# Patient Record
Sex: MALE | Race: BLACK OR AFRICAN AMERICAN | URBAN - METROPOLITAN AREA
[De-identification: names, ages, dates, MRNs, and addresses within clinical notes are randomized per-mention and may not be internally consistent; named-entity substitution may affect disease eponyms.]

---

## 2017-05-25 ENCOUNTER — OFFICE VISIT (OUTPATIENT)
Dept: FAMILY MEDICINE CLINIC | Facility: CLINIC | Age: 27
End: 2017-05-25

## 2017-05-25 VITALS
HEART RATE: 80 BPM | TEMPERATURE: 99 F | HEIGHT: 72.5 IN | SYSTOLIC BLOOD PRESSURE: 124 MMHG | RESPIRATION RATE: 20 BRPM | BODY MASS INDEX: 21.59 KG/M2 | DIASTOLIC BLOOD PRESSURE: 70 MMHG | WEIGHT: 161.19 LBS

## 2017-05-25 DIAGNOSIS — R19.7 DIARRHEA, UNSPECIFIED TYPE: Primary | ICD-10-CM

## 2017-05-25 PROCEDURE — 99203 OFFICE O/P NEW LOW 30 MIN: CPT | Performed by: FAMILY MEDICINE

## 2017-05-25 NOTE — PATIENT INSTRUCTIONS
Advice plenty of fluids, gatorade. May return to work tomorrow if feeling better. Return to clinic if diarrhea returns or any new concern. Advice to return to clinic for physical exam when improved.

## 2017-05-25 NOTE — PROGRESS NOTES
[facility]  PROGRESS NOTE  Chief Complaint:   Patient presents with:  Diarrhea: was seen at [facility] care   Note For Work      HPI:   This is a 33year old male presents to establish care at clinic.   Patient started having diarrhea last discomfort, palpitations, edema, dyspnea on exertion or at rest.  RESPIRATORY:  Denies shortness of breath, wheezing, cough or sputum.   GASTROINTESTINAL:  Denies abdominal pain, nausea, vomiting, constipation, denies any blood in stool, see HPI  Gladis Scales bruising, good turgor. LYMPHATIC: No cervical lymphadenopathy, no other lymphadenopathy. MUSCULOSKELETAL: Normal ROM, no joint pain, or muscle weakness in all extremity. BACK: No tenderness, no spasm, SLR test negative, FROM.    NEUROLOGICAL:  No defici

## 2017-06-08 ENCOUNTER — OFFICE VISIT (OUTPATIENT)
Dept: FAMILY MEDICINE CLINIC | Facility: CLINIC | Age: 27
End: 2017-06-08

## 2017-06-08 ENCOUNTER — HOSPITAL ENCOUNTER (OUTPATIENT)
Dept: GENERAL RADIOLOGY | Age: 27
Discharge: HOME | End: 2017-06-08
Attending: NURSE PRACTITIONER
Payer: COMMERCIAL

## 2017-06-08 ENCOUNTER — TELEPHONE (OUTPATIENT)
Dept: FAMILY MEDICINE CLINIC | Facility: CLINIC | Age: 27
End: 2017-06-08

## 2017-06-08 VITALS
RESPIRATION RATE: 20 BRPM | WEIGHT: 160 LBS | TEMPERATURE: 98 F | DIASTOLIC BLOOD PRESSURE: 80 MMHG | SYSTOLIC BLOOD PRESSURE: 100 MMHG | HEIGHT: 72.5 IN | HEART RATE: 80 BPM | BODY MASS INDEX: 21.43 KG/M2

## 2017-06-08 DIAGNOSIS — M25.512 ACUTE PAIN OF LEFT SHOULDER: Primary | ICD-10-CM

## 2017-06-08 DIAGNOSIS — M25.512 ACUTE PAIN OF LEFT SHOULDER: ICD-10-CM

## 2017-06-08 PROCEDURE — 73030 X-RAY EXAM OF SHOULDER: CPT | Performed by: NURSE PRACTITIONER

## 2017-06-08 PROCEDURE — 99213 OFFICE O/P EST LOW 20 MIN: CPT | Performed by: NURSE PRACTITIONER

## 2017-06-08 RX ORDER — IBUPROFEN 600 MG/1
600 TABLET ORAL EVERY 8 HOURS PRN
Qty: 50 TABLET | Refills: 0 | Status: SHIPPED | OUTPATIENT
Start: 2017-06-08

## 2017-06-08 NOTE — TELEPHONE ENCOUNTER
Patient informed of the below results and recommendations. Patient will contact [facility] to schedule an appt. Referral faxed. Patient will also stop by to  a copy of his xray. Reyes Prince and Mary informed to make a copy.  Karina Booth, 06/08/2017, 3:4

## 2017-06-08 NOTE — PROGRESS NOTES
HPI:    Patient ID: Virginia Castaneda is a 33year old male. HPI   Hurt shoulder on Monday when doing a flip. Landed on arms and felt the pain when pushing and twisting to get up. Review of Systems   Constitutional: Negative.     Musculoskeletal:

## 2017-06-08 NOTE — TELEPHONE ENCOUNTER
----- Message from BUSTER العلي sent at 6/8/2017  3:21 PM CDT -----  Radiologist read study as an [facility] separation. Recommend arm sling and referral to Ortho. Please have patient come to office to get a copy of the x-ray. Lisa Ferrara, 06/08/2017,

## 2017-06-08 NOTE — PATIENT INSTRUCTIONS
Recommend ice and ibuprofen. Will call if radiologist sees something. If not improving, Physical Therapy or ortho referral.   Otherwise follow-up as needed.

## 2017-08-30 ENCOUNTER — OFFICE VISIT (OUTPATIENT)
Dept: FAMILY MEDICINE CLINIC | Facility: CLINIC | Age: 27
End: 2017-08-30

## 2017-08-30 ENCOUNTER — LAB ENCOUNTER (OUTPATIENT)
Dept: LAB | Age: 27
End: 2017-08-30
Attending: NURSE PRACTITIONER
Payer: COMMERCIAL

## 2017-08-30 VITALS
RESPIRATION RATE: 16 BRPM | BODY MASS INDEX: 22.64 KG/M2 | SYSTOLIC BLOOD PRESSURE: 120 MMHG | OXYGEN SATURATION: 97 % | HEART RATE: 81 BPM | TEMPERATURE: 97 F | WEIGHT: 169 LBS | HEIGHT: 72.5 IN | DIASTOLIC BLOOD PRESSURE: 82 MMHG

## 2017-08-30 DIAGNOSIS — R11.10 ABDOMINAL PAIN WITH VOMITING: Primary | ICD-10-CM

## 2017-08-30 DIAGNOSIS — R10.9 ABDOMINAL PAIN WITH VOMITING: Primary | ICD-10-CM

## 2017-08-30 DIAGNOSIS — R11.10 ABDOMINAL PAIN WITH VOMITING: ICD-10-CM

## 2017-08-30 DIAGNOSIS — R11.2 NAUSEA AND VOMITING IN ADULT: ICD-10-CM

## 2017-08-30 DIAGNOSIS — R10.9 ABDOMINAL PAIN WITH VOMITING: ICD-10-CM

## 2017-08-30 DIAGNOSIS — R19.5 CHANGE IN STOOL: ICD-10-CM

## 2017-08-30 DIAGNOSIS — R10.84 GENERALIZED ABDOMINAL PAIN: ICD-10-CM

## 2017-08-30 PROBLEM — G43.109 MIGRAINE WITH AURA AND WITHOUT STATUS MIGRAINOSUS, NOT INTRACTABLE: Status: ACTIVE | Noted: 2017-08-30

## 2017-08-30 LAB
ALBUMIN SERPL-MCNC: 3.6 G/DL (ref 3.5–4.8)
ALP LIVER SERPL-CCNC: 62 U/L (ref 45–117)
ALT SERPL-CCNC: 15 U/L (ref 17–63)
APPEARANCE: CLEAR
AST SERPL-CCNC: 13 U/L (ref 15–41)
BASOPHILS # BLD AUTO: 0.04 X10(3) UL (ref 0–0.1)
BASOPHILS NFR BLD AUTO: 0.5 %
BILIRUB SERPL-MCNC: 0.4 MG/DL (ref 0.1–2)
BILIRUBIN: NEGATIVE
BUN BLD-MCNC: 12 MG/DL (ref 8–20)
CALCIUM BLD-MCNC: 8.9 MG/DL (ref 8.3–10.3)
CHLORIDE: 109 MMOL/L (ref 101–111)
CO2: 25 MMOL/L (ref 22–32)
CREAT BLD-MCNC: 0.94 MG/DL (ref 0.7–1.3)
EOSINOPHIL # BLD AUTO: 0.25 X10(3) UL (ref 0–0.3)
EOSINOPHIL NFR BLD AUTO: 2.9 %
ERYTHROCYTE [DISTWIDTH] IN BLOOD BY AUTOMATED COUNT: 14.2 % (ref 11.5–16)
GLUCOSE (URINE DIPSTICK): NEGATIVE MG/DL
GLUCOSE BLD-MCNC: 102 MG/DL (ref 70–99)
HCT VFR BLD AUTO: 40.7 % (ref 37–53)
HGB BLD-MCNC: 13.1 G/DL (ref 13–17)
IMMATURE GRANULOCYTE COUNT: 0.02 X10(3) UL (ref 0–1)
IMMATURE GRANULOCYTE RATIO %: 0.2 %
KETONES (URINE DIPSTICK): NEGATIVE MG/DL
LEUKOCYTES: NEGATIVE
LYMPHOCYTES # BLD AUTO: 2.5 X10(3) UL (ref 0.9–4)
LYMPHOCYTES NFR BLD AUTO: 28.8 %
M PROTEIN MFR SERPL ELPH: 7.4 G/DL (ref 6.1–8.3)
MCH RBC QN AUTO: 27.6 PG (ref 27–33.2)
MCHC RBC AUTO-ENTMCNC: 32.2 G/DL (ref 31–37)
MCV RBC AUTO: 85.9 FL (ref 80–99)
MONOCYTES # BLD AUTO: 0.34 X10(3) UL (ref 0.1–0.6)
MONOCYTES NFR BLD AUTO: 3.9 %
MULTISTIX LOT#: NORMAL NUMERIC
NEUTROPHIL ABS PRELIM: 5.54 X10 (3) UL (ref 1.3–6.7)
NEUTROPHILS # BLD AUTO: 5.54 X10(3) UL (ref 1.3–6.7)
NEUTROPHILS NFR BLD AUTO: 63.7 %
NITRITE, URINE: NEGATIVE
OCCULT BLOOD: NEGATIVE
PH, URINE: 6.5 (ref 4.5–8)
PLATELET # BLD AUTO: 223 10(3)UL (ref 150–450)
POTASSIUM SERPL-SCNC: 4 MMOL/L (ref 3.6–5.1)
RBC # BLD AUTO: 4.74 X10(6)UL (ref 4.3–5.7)
RED CELL DISTRIBUTION WIDTH-SD: 44.2 FL (ref 35.1–46.3)
SODIUM SERPL-SCNC: 140 MMOL/L (ref 136–144)
SPECIFIC GRAVITY: 1.02 (ref 1–1.03)
URINE-COLOR: YELLOW
UROBILINOGEN,SEMI-QN: 1 MG/DL (ref 0–1.9)
WBC # BLD AUTO: 8.7 X10(3) UL (ref 4–13)

## 2017-08-30 PROCEDURE — 83013 H PYLORI (C-13) BREATH: CPT | Performed by: NURSE PRACTITIONER

## 2017-08-30 PROCEDURE — 36415 COLL VENOUS BLD VENIPUNCTURE: CPT | Performed by: NURSE PRACTITIONER

## 2017-08-30 PROCEDURE — 80053 COMPREHEN METABOLIC PANEL: CPT | Performed by: NURSE PRACTITIONER

## 2017-08-30 PROCEDURE — 81003 URINALYSIS AUTO W/O SCOPE: CPT | Performed by: NURSE PRACTITIONER

## 2017-08-30 PROCEDURE — 85025 COMPLETE CBC W/AUTO DIFF WBC: CPT | Performed by: NURSE PRACTITIONER

## 2017-08-30 PROCEDURE — 99214 OFFICE O/P EST MOD 30 MIN: CPT | Performed by: NURSE PRACTITIONER

## 2017-08-30 RX ORDER — OMEPRAZOLE 20 MG/1
20 CAPSULE, DELAYED RELEASE ORAL
Qty: 30 CAPSULE | Refills: 1 | Status: SHIPPED | OUTPATIENT
Start: 2017-08-30

## 2017-08-30 NOTE — PROGRESS NOTES
HPI:   Marc Mcdonald is a 33year old male who presents for abdominal pain:       Characteristics of the pain are as follows: Location: RUQ with radiation to back and sternum  Quality: throbbing usually, can be sharp at times.    Quantity: 8/10 in inte /82   Pulse 81   Temp (!) 97.1 °F (36.2 °C) (Tympanic)   Resp 16   Ht 72.5\"   Wt 169 lb   SpO2 97%   BMI 22.61 kg/m²   Estimated body mass index is 22.61 kg/m² as calculated from the following:    Height as of this encounter: 72.5\".     Weight as of Stop ibuprofen, take Tylenol if needed for the headache. Decrease smoking. Stop caffeine, marijuana, and alcohol. Recheck in 2 weeks with PCP - sooner if any signs of GI bleeding.   When You Have Gastrointestinal (GI) Bleeding    Blood in your vomit or s If blood is coming out in your stool, you may have a lower GI tract problem or a very fast upper GI tract bleed. Bleeding from the GI tract can be bright red.  Or it may look dark and tarry. Tests may also find blood in your stool that can’t be seen with th · Signs of fluid loss (dehydration). These include a dry, sticky mouth, decreased urine output; and very dark urine. · Belly (abdominal) pain   Date Last Reviewed: 7/1/2016  © [id number] [employer] [address] [place] [address], [address] [place] [address].

## 2017-08-31 LAB — H. PYLORI BREATH TEST: NEGATIVE

## 2017-09-01 ENCOUNTER — TELEPHONE (OUTPATIENT)
Dept: FAMILY MEDICINE CLINIC | Facility: CLINIC | Age: 27
End: 2017-09-01

## 2017-09-01 NOTE — TELEPHONE ENCOUNTER
----- Message from BUSTER Rodriguez sent at 9/1/2017  7:20 AM CDT -----  Labs basically all normal. Recommend recheck in 2 weeks with provider. If not improving, consider referral to GI. Please let patient know. Thank you.  Lisa Ferrara, 09/01/17, 7
